# Patient Record
Sex: MALE
[De-identification: names, ages, dates, MRNs, and addresses within clinical notes are randomized per-mention and may not be internally consistent; named-entity substitution may affect disease eponyms.]

---

## 2018-12-30 ENCOUNTER — TRANSCRIPTION ENCOUNTER (OUTPATIENT)
Age: 48
End: 2018-12-30

## 2019-01-27 ENCOUNTER — TRANSCRIPTION ENCOUNTER (OUTPATIENT)
Age: 49
End: 2019-01-27

## 2019-06-23 PROBLEM — Z00.00 ENCOUNTER FOR PREVENTIVE HEALTH EXAMINATION: Status: ACTIVE | Noted: 2019-06-23

## 2019-07-22 ENCOUNTER — APPOINTMENT (OUTPATIENT)
Dept: UROLOGY | Facility: CLINIC | Age: 49
End: 2019-07-22
Payer: MEDICAID

## 2019-07-22 VITALS
SYSTOLIC BLOOD PRESSURE: 101 MMHG | DIASTOLIC BLOOD PRESSURE: 65 MMHG | WEIGHT: 165 LBS | HEART RATE: 80 BPM | HEIGHT: 66 IN | BODY MASS INDEX: 26.52 KG/M2

## 2019-07-22 DIAGNOSIS — N48.6 INDURATION PENIS PLASTICA: ICD-10-CM

## 2019-07-22 DIAGNOSIS — Z87.828 PERSONAL HISTORY OF OTHER (HEALED) PHYSICAL INJURY AND TRAUMA: ICD-10-CM

## 2019-07-22 DIAGNOSIS — Z87.891 PERSONAL HISTORY OF NICOTINE DEPENDENCE: ICD-10-CM

## 2019-07-22 DIAGNOSIS — Z83.3 FAMILY HISTORY OF DIABETES MELLITUS: ICD-10-CM

## 2019-07-22 DIAGNOSIS — Z82.49 FAMILY HISTORY OF ISCHEMIC HEART DISEASE AND OTHER DISEASES OF THE CIRCULATORY SYSTEM: ICD-10-CM

## 2019-07-22 LAB
BACTERIA: 0
BILIRUB UR QL STRIP: NORMAL
CASTS: 0
CLARITY UR: CLEAR
COLLECTION METHOD: NORMAL
CRYSTALS: 0
EPITHELIAL CELLS: 0
GLUCOSE UR-MCNC: NORMAL
HCG UR QL: 0.2 EU/DL
HGB UR QL STRIP.AUTO: NORMAL
KETONES UR-MCNC: NORMAL
LEUKOCYTE ESTERASE UR QL STRIP: NORMAL
MUCUS: 0
NITRITE UR QL STRIP: NORMAL
PH UR STRIP: 7
PROT UR STRIP-MCNC: NORMAL
RBC CASTS # UR COMP ASSIST: 0
SP GR UR STRIP: 1.02
WBC: 0

## 2019-07-22 PROCEDURE — 81000 URINALYSIS NONAUTO W/SCOPE: CPT

## 2019-07-22 PROCEDURE — 99205 OFFICE O/P NEW HI 60 MIN: CPT | Mod: 25

## 2019-07-22 NOTE — ASSESSMENT
[FreeTextEntry1] : An hour and 30 minutes were spent with this patient discussing what appears to be traumatically induced Peyronie's disease. The condition assorted permanence of resistance to ibuprofen therapy. Surgical intervention requires specialized care time I be available at the [facility] where Mr. Spencer Patino already has an appointment and followup to recent evaluation for "bladder pain". He's been advised to return to those doctors to discuss this problem in the future.

## 2019-07-22 NOTE — PHYSICAL EXAM
[General Appearance - Well Nourished] : well nourished [Normal Appearance] : normal appearance [Well Groomed] : well groomed [General Appearance - In No Acute Distress] : no acute distress [Normal Rate] : normal [5th Left ICS - MCL] : palpated at the 5th LICS in the midclavicular line [Normal S1] : normal S1 [Normal S2] : normal S2 [No Murmur] : no murmurs heard [No Pitting Edema] : no pitting edema present [Exaggerated Use Of Accessory Muscles For Inspiration] : no accessory muscle use [Respiration, Rhythm And Depth] : normal respiratory rhythm and effort [Auscultation Breath Sounds / Voice Sounds] : lungs were clear to auscultation bilaterally [Chest Palpation] : palpation of the chest revealed no abnormalities [Bowel Sounds] : normal bowel sounds [Lungs Percussion] : the lungs were normal to percussion [Abdomen Soft] : soft [Abdomen Tenderness] : non-tender [Costovertebral Angle Tenderness] : no ~M costovertebral angle tenderness [Abdomen Mass (___ Cm)] : no abdominal mass palpated [Size (2+)] : size was 2+ [Nl Sphincter Tone] : normal sphincter tone [Nl Perineum] : perineum was normal on inspection [No Lesions] : no lesions [Dorsal] : dorsal aspect [Single Plaque ___cm] : a single [unfilled] ~Ucm [Mid Shaft] : mid shaft [Normal] : normal [Testes] : normal [Epididymis] : was normal [Vas Deferens / Spermatic Cord] : was normal [Normal Station and Gait] : the gait and station were normal for the patient's age [Skin Color & Pigmentation] : normal skin color and pigmentation [] : no rash [No Focal Deficits] : no focal deficits [Oriented To Time, Place, And Person] : oriented to person, place, and time [Affect] : the affect was normal [Mood] : the mood was normal [Not Anxious] : not anxious [No Palpable Adenopathy] : no palpable adenopathy [S3] : no S3 [S4] : no S4 [FreeTextEntry1] : recurrent left inguinal hernia [Prostate Hard Area Or Nodule Bilaterally] : had no palpable nodules [Rectal Exam - Prostate] : was not indurated [Prostate Tenderness] : was not tender [Prostate Fluctuant] : was not fluctuant [Occult Blood Positive] : exam was negative for occult blood [Rectal Tenderness] : no tenderness [Mass___cm] : no rectal masses [Adherent Prepuce] : no adherence of the prepuce [Swelling] : no swelling [Phimosis] : no phimosis [Tenderness] : no tenderness [Paraphimosis] : no paraphimosis [Discharge] : no discharge [Mass ___cm] : no mass [Balanitis] : no balanitis [Circumcised] : the penis was uncircumcised

## 2019-07-22 NOTE — CHIEF COMPLAINT
[FreeTextEntry1] : HISTORY OF PENILE TRAUMA:  Mid shaft penile waisting with distal 1/2 of penis soft; proximal part hard

## 2019-07-22 NOTE — REVIEW OF SYSTEMS
[Constipation] : constipation [Nocturia] : nocturia [Limb Pain] : limb pain [Negative] : Heme/Lymph [Feeling Poorly] : not feeling poorly [Fever] : no fever [Chest Pain] : no chest pain [Palpitations] : no palpitations [Leg Claudication] : no intermittent leg claudication [Shortness Of Breath] : no shortness of breath [Orthopnea] : no orthopnea [Cough] : no cough [Wheezing] : no wheezing [SOB on Exertion] : no shortness of breath during exertion [PND] : no PND [Abdominal Pain] : no abdominal pain [Vomiting] : no vomiting [Diarrhea] : no diarrhea [Heartburn] : no heartburn [Melena] : no melena [Dysuria] : no dysuria [Incontinence] : no incontinence [Hesitancy] : no urinary hesitancy [Skin Lesions] : no skin lesions [Itching] : no itching [An Unusual Growth] : no unusual growth on the skin

## 2019-07-22 NOTE — HISTORY OF PRESENT ILLNESS
[FreeTextEntry1] : (New patient)\par \par Aries Patino is a 47 yo man who comes to  office today with a chief complaint of mid shaft penile waisting present for the past 3-4 months.  He first experienced an episode of penile pain  during intercourse.3-4 months ago associated with the waisting almost immediately.  Mr. Spencer Patino experienced penile trauma (bending intercourse) 1.25 years with plaque formation 2 months afterward.   The plaque became tender 3 months ago.  He took 800mg TID for 10 days.  This did no stop the pain.\par \par Mr. Spencer Patino had been seen by  MDs at [facility] for bladder pain beginning 9 months ago.  He underwent cystoscopy in November (before he developed pain or waisting).  This study was normal.  He also reported episode of ejaculation without stimulation when taking cold medication (initially occurring while using cocaine).  This still occurs if he takes OTC fold medication.  Last event was around June 2018.\par \par CURRENT UROLOGIC REVIEW OF SYSTEMS:\par \par Incontinence Assessed?.  YES\par \par Nocturia:  (+) 1-2  x/night\par Frequency: (-)  3-4   x/day    \par Dysuria: (-)\par Urgency:  (-) \par Hesitancy:  (-)\par Intermittency:  (-)\par Sensation of Incomplete Voiding :  (+); \par Double voiding :  (-)\par Stress incontinence:  (-)\par Urge incontinence:  (-)\par Use of absorbent pads:  (-)\par Terminal dribbling:  (-)\par Status of stream: "Not strong" (CUF today was actually quite good)\par Venereal disease:  (-)\par Kidney stones:  (-)\par UTIs:  (-)\par Prior urologic surgery:  (+)  recent cysto\par Hematuria:  (-)\par Abdominal pressure:  (-)\par Back pain:  (-)\par Sexual Status: see HPI\par Gout:  (-)\par Renal problems:  (-)\par Family History of Urologic Problems:  (-)\par International Prostate Symptom Score (IPSS):  (Mild 0-7), (Moderate 8-19), (Severe 20-35), \par \par \par \par   \par